# Patient Record
Sex: MALE | Race: WHITE | Employment: FULL TIME | URBAN - METROPOLITAN AREA
[De-identification: names, ages, dates, MRNs, and addresses within clinical notes are randomized per-mention and may not be internally consistent; named-entity substitution may affect disease eponyms.]

---

## 2020-11-04 ENCOUNTER — INITIAL CONSULT (OUTPATIENT)
Dept: SURGERY | Age: 60
End: 2020-11-04
Payer: COMMERCIAL

## 2020-11-04 VITALS
DIASTOLIC BLOOD PRESSURE: 86 MMHG | SYSTOLIC BLOOD PRESSURE: 128 MMHG | BODY MASS INDEX: 46.65 KG/M2 | TEMPERATURE: 97.8 F | WEIGHT: 315 LBS | HEIGHT: 69 IN

## 2020-11-04 PROCEDURE — 99241 PR OFFICE CONSULTATION NEW/ESTAB PATIENT 15 MIN: CPT | Performed by: SURGERY

## 2020-11-04 RX ORDER — AMLODIPINE BESYLATE 10 MG/1
10 TABLET ORAL DAILY
COMMUNITY
Start: 2020-10-10

## 2020-11-04 RX ORDER — ACETAMINOPHEN,DIPHENHYDRAMINE HCL 500; 25 MG/1; MG/1
1 TABLET, FILM COATED ORAL NIGHTLY PRN
COMMUNITY

## 2020-11-04 SDOH — HEALTH STABILITY: MENTAL HEALTH: HOW OFTEN DO YOU HAVE A DRINK CONTAINING ALCOHOL?: NEVER

## 2020-11-05 PROBLEM — M79.89 SOFT TISSUE MASS: Status: ACTIVE | Noted: 2020-11-05

## 2020-11-16 ENCOUNTER — HOSPITAL ENCOUNTER (OUTPATIENT)
Age: 60
Discharge: HOME | End: 2020-11-16
Payer: COMMERCIAL

## 2020-11-16 PROCEDURE — U0003 INFECTIOUS AGENT DETECTION BY NUCLEIC ACID (DNA OR RNA); SEVERE ACUTE RESPIRATORY SYNDROME CORONAVIRUS 2 (SARS-COV-2) (CORONAVIRUS DISEASE [COVID-19]), AMPLIFIED PROBE TECHNIQUE, MAKING USE OF HIGH THROUGHPUT TECHNOLOGIES AS DESCRIBED BY CMS-2020-01-R: HCPCS

## 2020-11-17 NOTE — PROGRESS NOTES
PAT completed with patient no orders with chart or in Epic will check with MD DOS, pt having local procedure aware he can take home medications and may have light breakfast aware only 1 family member can come with him DOS aware they will remain in waiting room and be updated by staff. Britany Alas

## 2020-11-18 LAB — SARS-COV-2: NOT DETECTED

## 2020-11-22 NOTE — PROGRESS NOTES
[facility]    HPI:  Patient is 64y.o. year old male seen at request of Rosamaria Bhatia MD.  He presents because of lump located on scalp. There has been pain at or near the lesion and a recent increase in size. There is not  previous history of similar. There has not been any biopsy. History reviewed. No pertinent past medical history. Past Surgical History:   Procedure Laterality Date    CHOLECYSTECTOMY      1996 approx       Current Outpatient Medications on File Prior to Visit   Medication Sig Dispense Refill    amLODIPine (NORVASC) 10 MG tablet Take 10 mg by mouth daily       ZINC PO Take 50 mg by mouth daily       POTASSIUM PO Take 99 mg by mouth       MAGNESIUM PO Take 250 mg by mouth daily       diphenhydrAMINE-APAP, sleep, (TYLENOL PM EXTRA STRENGTH)  MG tablet Take 1 tablet by mouth nightly as needed       No current facility-administered medications on file prior to visit.         Allergies   Allergen Reactions    Latex      LATEX GLOVES         Social History     Socioeconomic History    Marital status: Single     Spouse name: Not on file    Number of children: Not on file    Years of education: Not on file    Highest education level: Not on file   Occupational History    Not on file   Social Needs    Financial resource strain: Not on file    Food insecurity     Worry: Not on file     Inability: Not on file    Transportation needs     Medical: Not on file     Non-medical: Not on file   Tobacco Use    Smoking status: Former Smoker    Smokeless tobacco: Never Used   Substance and Sexual Activity    Alcohol use: Never     Frequency: Never    Drug use: Never    Sexual activity: Not on file   Lifestyle    Physical activity     Days per week: Not on file     Minutes per session: Not on file    Stress: Not on file   Relationships    Social connections     Talks on phone: Not on file     Gets together: Not on file     Attends [employer] service: Not on file     Active member of club or organization: Not on file     Attends meetings of clubs or organizations: Not on file     Relationship status: Not on file    Intimate partner violence     Fear of current or ex partner: Not on file     Emotionally abused: Not on file     Physically abused: Not on file     Forced sexual activity: Not on file   Other Topics Concern    Not on file   Social History Narrative    Not on file       Family History   Problem Relation Age of Onset    High Blood Pressure Mother     Heart Disease Mother     Stroke Mother     High Blood Pressure Father     Diabetes Father     Cancer Father         lung       ROS:  He reports no complaints related to the eyes, ears , nose throat or mouth. He denies weight loss. No chest pain. No SOB. No urinary complaints. No musculoskeletal complaints. Skin complaints include lump scalp. No neurologic deficits. No bleeding tendencies. No GI complaints. Physical Exam:  Vitals:    11/04/20 1533   BP: 128/86   Temp: 97.8 °F (36.6 °C)   320#    General:  Comfortable. No distress. Eyes:  No scleral icterus  Ears:  Normal  Nose:  Normal  Mouth:  Mucous membranes moist  Respiratory: Lungs CTA. No accessory muscle use. Heart:  Regular rhythm  Abdomen:  Soft. Non tender. Non distended. Musculoskeletal:  No abnormal movements. ROM extremities normal.  Skin:  No rashes. Lesion    Location:   Scalp   Pigmented:   No   Diameter:  2 cm   Crusting absent             Neurologic:  No focal deficits. Psychiatric:  AAA. O x 3.            ASSESSMENT:  1. Pre-op testing    2. Soft tissue mass            PLAN:  The diagnosis and recommended procedure were explained. Questions answered. Prepare for surgery to remove symptomatic mass. Greater than 50% visit spent counseling about diagnosis, treatment plan and expected post operative course.     The patient was counseled at length about the risks of mary Covid-19 during their perioperative period and any recovery window from their procedure. The patient was made aware that mary Covid-19  may worsen their prognosis for recovering from their procedure  and lend to a higher morbidity and/or mortality risk. All material risks, benefits, and reasonable alternatives including postponing the procedure were discussed. The patient does wish to proceed with the procedure at this time.

## 2020-11-23 ENCOUNTER — HOSPITAL ENCOUNTER (OUTPATIENT)
Age: 60
Setting detail: OUTPATIENT SURGERY
Discharge: HOME | End: 2020-11-23
Attending: SURGERY | Admitting: SURGERY
Payer: COMMERCIAL

## 2020-11-23 VITALS
TEMPERATURE: 97.8 F | HEART RATE: 56 BPM | RESPIRATION RATE: 18 BRPM | OXYGEN SATURATION: 96 % | HEIGHT: 70 IN | BODY MASS INDEX: 45.1 KG/M2 | SYSTOLIC BLOOD PRESSURE: 155 MMHG | DIASTOLIC BLOOD PRESSURE: 93 MMHG | WEIGHT: 315 LBS

## 2020-11-23 PROCEDURE — 2500000003 HC RX 250 WO HCPCS: Performed by: SURGERY

## 2020-11-23 PROCEDURE — 7100000010 HC PHASE II RECOVERY - FIRST 15 MIN: Performed by: SURGERY

## 2020-11-23 PROCEDURE — 2709999900 HC NON-CHARGEABLE SUPPLY: Performed by: SURGERY

## 2020-11-23 PROCEDURE — 2580000003 HC RX 258: Performed by: SURGERY

## 2020-11-23 PROCEDURE — 3600000002 HC SURGERY LEVEL 2 BASE: Performed by: SURGERY

## 2020-11-23 PROCEDURE — 3600000012 HC SURGERY LEVEL 2 ADDTL 15MIN: Performed by: SURGERY

## 2020-11-23 PROCEDURE — 88305 TISSUE EXAM BY PATHOLOGIST: CPT

## 2020-11-23 PROCEDURE — 21014 EXC FACE TUM DEEP 2 CM/>: CPT | Performed by: SURGERY

## 2020-11-23 RX ORDER — LIDOCAINE HYDROCHLORIDE 10 MG/ML
INJECTION, SOLUTION EPIDURAL; INFILTRATION; INTRACAUDAL; PERINEURAL PRN
Status: DISCONTINUED | OUTPATIENT
Start: 2020-11-23 | End: 2020-11-23 | Stop reason: ALTCHOICE

## 2020-11-23 RX ORDER — BUPIVACAINE HYDROCHLORIDE 5 MG/ML
INJECTION, SOLUTION EPIDURAL; INTRACAUDAL PRN
Status: DISCONTINUED | OUTPATIENT
Start: 2020-11-23 | End: 2020-11-23 | Stop reason: ALTCHOICE

## 2020-11-23 RX ORDER — MAGNESIUM HYDROXIDE 1200 MG/15ML
LIQUID ORAL CONTINUOUS PRN
Status: COMPLETED | OUTPATIENT
Start: 2020-11-23 | End: 2020-11-23

## 2020-11-23 NOTE — BRIEF OP NOTE
Brief Postoperative Note      Patient: Dolores Lugo  YOB: 1960  MRN: [id number]    Date of Procedure: 11/23/2020    Pre-Op Diagnosis: SOFT TISSUE MASS SCALP    Post-Op Diagnosis: Same       Procedure(s):  EXCISION SOFT TISSUE MASS SCALP    Surgeon(s):  Michelle Gonzales MD    Assistant:  Surgical Assistant: Tom Gomes    Anesthesia: Local    Estimated Blood Loss (mL): Minimal    Complications: None    Specimens:   * No specimens in log *    Implants:  * No implants in log *      Drains: * No LDAs found *    Findings: As above    Electronically signed by Bonifacio Kahn MD on 11/23/2020 at 8:57 AM

## 2020-11-23 NOTE — H&P
[facility] GENERAL SURGERY      The H&P was reviewed, the patient was examined, and no change has occurred in the patient's condition since the H&P was completed. The indications for the procedure were reviewed, and any questions were answered. I updated the progress note from 11/4/2020 which is the H&P.     Vitals:    11/23/20 0721   BP: (!) 157/88   Pulse: 68   Resp: 20   Temp: 97 °F (36.1 °C)   SpO2: 98%

## 2020-11-24 NOTE — OP NOTE
[address]                 [address] [address]                                OPERATIVE REPORT    PATIENT NAME: Adrian Martin             :        1960  MED REC NO:   [id number]                          ROOM:  ACCOUNT NO:   [de-identified]                           ADMIT DATE: 2020  PROVIDER:     Qiana Lamar MD    DATE OF PROCEDURE:  2020    PREOPERATIVE DIAGNOSIS:  Soft tissue mass, scalp. POSTOPERATIVE DIAGNOSIS:  Soft tissue mass, scalp. OPERATION PERFORMED:  Excision subfascial soft tissue mass of the scalp. SURGEON:  Qiana Lamar MD    ANESTHESIA:  Local.    COMPLICATIONS:  None. ESTIMATED BLOOD LOSS:  Less than 50 mL. INDICATIONS FOR OPERATION:  A 44-year-old male with a soft tissue mass  on the scalp. It was enlarging. It was causing acute pain and skin  sensation disturbance. I recommended operative excision. The risks and  benefits were explained. The patient understood them, accepted them,  and elected to proceed. DESCRIPTION OF OPERATION:  The patient was brought to the operating  room. He was prepped and draped in usual surgical sterile fashion. Local anesthetic was infiltrated. An incision was made overlying the  mass. I dissected down. I had to incise the investing fascia. This  lump was sitting right on top of the skull. I removed the 2.5 cm  diameter soft tissue mass. Hemostasis was obtained. 3-0 Vicryl was  used to reapproximate the subcutaneous tissues. 4-0 Vicryl was used to  reapproximate the skin. Dressing was placed. DISPOSITION:  The patient tolerated the procedure without any acute  complication.         Oli Castañeda MD    D: 2020 16:33:15       T: 2020 16:38:23     [email or web address]  Job#: [id number]     Doc#: [id number]    CC:  Kimberly Tucker MD

## 2020-11-25 ENCOUNTER — TELEPHONE (OUTPATIENT)
Dept: SURGERY | Age: 60
End: 2020-11-25

## (undated) DEVICE — APPLICATOR PREP 26ML 0.7% IOD POVACRYLEX 74% ISO ALC ST

## (undated) DEVICE — TIP SUCT DIA12FR W STYL CTRL VENT DISPOSABLE FRAZ

## (undated) DEVICE — GOWN,AURORA,NONREINF,RAGLAN,XXL,STERILE: Brand: MEDLINE

## (undated) DEVICE — SYRINGE MED 10ML LUERLOCK TIP W/O SFTY DISP

## (undated) DEVICE — MAJOR SET UP PK

## (undated) DEVICE — PACK,UNIVERSAL,SPLIT,II,AURORA: Brand: MEDLINE

## (undated) DEVICE — CANNULA NSL 13FT TUBE AD ETCO2 DIV SAMP M

## (undated) DEVICE — NEEDLE HYPO 25GA L1.5IN BLU POLYPR HUB S STL REG BVL STR

## (undated) DEVICE — ELECTRODE PT RET AD L9FT HI MOIST COND ADH HYDRGEL CORDED

## (undated) DEVICE — SUTURE VCRL SZ 3-0 L18IN ABSRB UD L26MM SH 1/2 CIR J864D

## (undated) DEVICE — GLOVE ORANGE PI 8 1/2   MSG9085

## (undated) DEVICE — SOLUTION IV IRRIG 500ML 0.9% SODIUM CHL 2F7123

## (undated) DEVICE — GOWN SIRUS NONREIN XL W/TWL: Brand: MEDLINE INDUSTRIES, INC.

## (undated) DEVICE — GAUZE,SPONGE,4"X4",8PLY,STRL,LF,10/TRAY: Brand: MEDLINE